# Patient Record
Sex: MALE | Race: WHITE | Employment: UNEMPLOYED | URBAN - METROPOLITAN AREA
[De-identification: names, ages, dates, MRNs, and addresses within clinical notes are randomized per-mention and may not be internally consistent; named-entity substitution may affect disease eponyms.]

---

## 2023-04-25 ENCOUNTER — TELEMEDICINE (OUTPATIENT)
Dept: PRIMARY CARE | Facility: CLINIC | Age: 33
End: 2023-04-25
Payer: MEDICARE

## 2023-04-25 DIAGNOSIS — J20.9 ACUTE BRONCHITIS, UNSPECIFIED ORGANISM: Primary | ICD-10-CM

## 2023-04-25 PROCEDURE — 99213 OFFICE O/P EST LOW 20 MIN: CPT | Performed by: FAMILY MEDICINE

## 2023-04-25 RX ORDER — AZITHROMYCIN 250 MG/1
TABLET, FILM COATED ORAL
Qty: 6 TABLET | Refills: 0 | Status: SHIPPED | OUTPATIENT
Start: 2023-04-25 | End: 2023-04-30

## 2023-04-25 ASSESSMENT — ENCOUNTER SYMPTOMS
NAUSEA: 1
COUGH: 1
HEADACHES: 1
SORE THROAT: 1
FEVER: 1
WHEEZING: 1
ABDOMINAL PAIN: 0
DIARRHEA: 0
VOMITING: 1

## 2023-04-25 NOTE — PROGRESS NOTES
Subjective   Patient ID: [id number]   Virtual or Telephone Consent    An interactive audio and video telecommunication system which permits real time communications between the patient (at the originating site) and provider (at the distant site) was utilized to provide this telehealth service.   Verbal consent was requested and obtained from Meera Akbar on this date, 04/25/23 for a telehealth visit.     Meera Akbar is a 32 y.o. male who presents for No chief complaint on file..  Fever   This is a new problem. The current episode started yesterday. The problem occurs constantly. The problem has been waxing and waning. The maximum temperature noted was 99 to 99.9 F. The temperature was taken using an oral thermometer. Associated symptoms include congestion, coughing, headaches, muscle aches, nausea, a sore throat, vomiting and wheezing. Pertinent negatives include no abdominal pain, chest pain, diarrhea, ear pain, rash or sleepiness.     Onset yesterday.   He has had vomiting and fever.  Has a cough and wheezing.  No SOB.  No abdominal pain.  No diarrhea or constipation.  Has a sore throat.  He plans to do a covid test.    Objective     There were no vitals taken for this visit.     Physical Exam  Constitutional:       Appearance: Normal appearance.   Pulmonary:      Effort: Pulmonary effort is normal. No respiratory distress.   Neurological:      General: No focal deficit present.      Mental Status: He is alert and oriented to person, place, and time.         Assessment/Plan   Problem List Items Addressed This Visit    None  Visit Diagnoses       Acute bronchitis, unspecified organism    -  Primary    Relevant Medications    azithromycin (Zithromax) 250 mg tablet        I recommend getting a covid test.  I called in antibiotics in case you test negative.      Mitchell Camejo, DO

## 2024-02-07 ENCOUNTER — TELEMEDICINE (OUTPATIENT)
Dept: PRIMARY CARE | Facility: CLINIC | Age: 34
End: 2024-02-07
Payer: MEDICARE

## 2024-02-07 DIAGNOSIS — R11.2 NAUSEA AND VOMITING, UNSPECIFIED VOMITING TYPE: ICD-10-CM

## 2024-02-07 DIAGNOSIS — B34.9 VIRAL SYNDROME: Primary | ICD-10-CM

## 2024-02-07 PROCEDURE — 1036F TOBACCO NON-USER: CPT | Performed by: FAMILY MEDICINE

## 2024-02-07 PROCEDURE — 99213 OFFICE O/P EST LOW 20 MIN: CPT | Performed by: FAMILY MEDICINE

## 2024-02-07 ASSESSMENT — ENCOUNTER SYMPTOMS
ABDOMINAL PAIN: 1
NAUSEA: 1
WHEEZING: 1
COUGH: 1
VOMITING: 1
HEADACHES: 1
FEVER: 1
SORE THROAT: 1

## 2024-02-07 NOTE — PROGRESS NOTES
Subjective   Patient ID: Meera Akbar is a 33 y.o. male who presents for No chief complaint on file..    Virtual or Telephone Consent    An interactive audio and video telecommunication system which permits real time communications between the patient (at the originating site) and provider (at the distant site) was utilized to provide this telehealth service.   Verbal consent was requested and obtained from Meera Akbar on this date, 02/07/24 for a telehealth visit.       Pt presents with cough, congestion, nausea, emesis  Minimally productive cough  Body aches  No SOB  No RX meds  No OTC meds for symptoms    No Asthma  Home COVID test, has not yet taken    Fever   This is a new problem. The current episode started yesterday. The problem occurs constantly. The problem has been waxing and waning. His temperature was unmeasured prior to arrival. Associated symptoms include abdominal pain, congestion, coughing, headaches, muscle aches, nausea, a sore throat, vomiting and wheezing. Pertinent negatives include no chest pain.        Review of Systems   Constitutional:  Positive for fever.   HENT:  Positive for congestion and sore throat.    Respiratory:  Positive for cough and wheezing.    Cardiovascular:  Negative for chest pain.   Gastrointestinal:  Positive for abdominal pain, nausea and vomiting.   Neurological:  Positive for headaches.       Objective   There were no vitals taken for this visit.    Physical Exam  Constitutional:       Appearance: Normal appearance.   HENT:      Nose: Congestion present.   Pulmonary:      Effort: Pulmonary effort is normal.   Neurological:      Mental Status: He is alert.       Virtual Visit Duration: 10 min    Assessment/Plan   Diagnoses and all orders for this visit:  Viral syndrome  Nausea and vomiting, unspecified vomiting type    Suspect viral syndrome, symptoms present x 2 days, reviewed typical course of viral illness   [facility] COVID and Flu testing  [facility] supportive care at  this time. Electrolyte replacing fluids, rest, tylenol, may add OTC Dayquil  Advised pt to call with any new or worsening of his symptoms    Yuni Arellano,

## 2024-09-10 ENCOUNTER — APPOINTMENT (OUTPATIENT)
Dept: PRIMARY CARE | Facility: CLINIC | Age: 34
End: 2024-09-10
Payer: MEDICARE

## 2024-09-13 ENCOUNTER — APPOINTMENT (OUTPATIENT)
Dept: PRIMARY CARE | Facility: CLINIC | Age: 34
End: 2024-09-13
Payer: MEDICARE

## 2024-09-17 ENCOUNTER — APPOINTMENT (OUTPATIENT)
Dept: PRIMARY CARE | Facility: CLINIC | Age: 34
End: 2024-09-17
Payer: MEDICARE

## 2024-09-17 VITALS
DIASTOLIC BLOOD PRESSURE: 70 MMHG | SYSTOLIC BLOOD PRESSURE: 102 MMHG | TEMPERATURE: 97.9 F | HEIGHT: 60 IN | WEIGHT: 111 LBS | BODY MASS INDEX: 21.79 KG/M2

## 2024-09-17 DIAGNOSIS — Z00.00 ROUTINE GENERAL MEDICAL EXAMINATION AT HEALTH CARE FACILITY: Primary | ICD-10-CM

## 2024-09-17 PROCEDURE — 1036F TOBACCO NON-USER: CPT | Performed by: FAMILY MEDICINE

## 2024-09-17 PROCEDURE — 3008F BODY MASS INDEX DOCD: CPT | Performed by: FAMILY MEDICINE

## 2024-09-17 PROCEDURE — 99395 PREV VISIT EST AGE 18-39: CPT | Performed by: FAMILY MEDICINE

## 2024-09-17 PROCEDURE — G0439 PPPS, SUBSEQ VISIT: HCPCS | Performed by: FAMILY MEDICINE

## 2024-09-17 ASSESSMENT — ENCOUNTER SYMPTOMS
SLEEP DISTURBANCE: 0
TROUBLE SWALLOWING: 0
WOUND: 0
COUGH: 0
ARTHRALGIAS: 0
CHILLS: 0
BACK PAIN: 0
PALPITATIONS: 0
OCCASIONAL FEELINGS OF UNSTEADINESS: 0
RHINORRHEA: 0
VOICE CHANGE: 0
WEAKNESS: 0
NERVOUS/ANXIOUS: 0
FEVER: 0
NUMBNESS: 0
SHORTNESS OF BREATH: 0
SINUS PAIN: 0
DYSPHORIC MOOD: 0
NAUSEA: 0
HEMATURIA: 0
DEPRESSION: 0
DIARRHEA: 0
LOSS OF SENSATION IN FEET: 0
ADENOPATHY: 0
MYALGIAS: 0
DYSURIA: 0
ABDOMINAL PAIN: 0
HEADACHES: 0
BLOOD IN STOOL: 0
SORE THROAT: 0
ROS SKIN COMMENTS: NO MOLES GROWING OR CHANGING.
CONSTIPATION: 0
VOMITING: 0
DIZZINESS: 0
WHEEZING: 0
FREQUENCY: 0
FATIGUE: 0

## 2024-09-17 ASSESSMENT — PATIENT HEALTH QUESTIONNAIRE - PHQ9
1. LITTLE INTEREST OR PLEASURE IN DOING THINGS: NOT AT ALL
2. FEELING DOWN, DEPRESSED OR HOPELESS: NOT AT ALL
SUM OF ALL RESPONSES TO PHQ9 QUESTIONS 1 AND 2: 0

## 2024-09-17 ASSESSMENT — ACTIVITIES OF DAILY LIVING (ADL)
GROCERY_SHOPPING: INDEPENDENT
DOING_HOUSEWORK: INDEPENDENT
DRESSING: INDEPENDENT
TAKING_MEDICATION: INDEPENDENT
BATHING: INDEPENDENT
MANAGING_FINANCES: INDEPENDENT

## 2024-09-17 NOTE — PROGRESS NOTES
Subjective   Reason for Visit: Meera Akbar is an 33 y.o. male here for a Medicare Wellness visit.          Reviewed all medications by prescribing practitioner or clinical pharmacist (such as prescriptions, OTCs, herbal therapies and supplements) and documented in the medical record.    No current outpatient medications on file prior to visit.     No current facility-administered medications on file prior to visit.     He has started working out.  He was overweight and has lost some weight.  He has been walking on the track.      No surgeries or hospitalizations over the last year.        HPI  Tobacco Use: Low Risk  (9/17/2024)    Patient History     Smoking Tobacco Use: Never     Smokeless Tobacco Use: Never     Passive Exposure: Not on file   No drugs.  No alcohol (very rare).      Patient Care Team:  Mitchell Camejo DO as PCP - General  Mitchell Camejo DO as PCP - [facility] [employer] PCP     Review of Systems   Constitutional:  Negative for chills, fatigue and fever.   HENT:  Negative for congestion, rhinorrhea, sinus pain, sore throat, trouble swallowing and voice change.    Respiratory:  Negative for cough, shortness of breath and wheezing.    Cardiovascular:  Negative for chest pain, palpitations and leg swelling.   Gastrointestinal:  Negative for abdominal pain, blood in stool, constipation, diarrhea, nausea and vomiting.   Genitourinary:  Negative for dysuria, frequency, hematuria, scrotal swelling and testicular pain.   Musculoskeletal:  Negative for arthralgias, back pain and myalgias.   Skin:  Negative for rash and wound.        No moles growing or changing.   Neurological:  Negative for dizziness, syncope, weakness, numbness and headaches.   Hematological:  Negative for adenopathy.   Psychiatric/Behavioral:  Negative for dysphoric mood, self-injury, sleep disturbance and suicidal ideas. The patient is not nervous/anxious.        Objective   Vitals:  /70 (BP Location: Left arm,  "Patient Position: Sitting)   Temp 36.6 °C (97.9 °F) (Skin)   Ht 1.168 m (3' 10\")   Wt 50.3 kg (111 lb)   BMI 36.88 kg/m²       Physical Exam  Vitals reviewed.   Constitutional:       General: He is not in acute distress.     Appearance: Normal appearance. He is not ill-appearing or toxic-appearing.   HENT:      Head: Normocephalic and atraumatic.      Right Ear: Tympanic membrane, ear canal and external ear normal.      Left Ear: Tympanic membrane, ear canal and external ear normal.      Nose: Nose normal.      Mouth/Throat:      Mouth: Mucous membranes are moist.   Eyes:      Extraocular Movements: Extraocular movements intact.      Conjunctiva/sclera: Conjunctivae normal.      Pupils: Pupils are equal, round, and reactive to light.   Cardiovascular:      Rate and Rhythm: Normal rate and regular rhythm.      Heart sounds: No murmur heard.  Pulmonary:      Effort: Pulmonary effort is normal.      Breath sounds: Normal breath sounds.   Abdominal:      General: Bowel sounds are normal. There is no distension.      Palpations: Abdomen is soft. There is no mass.      Tenderness: There is no abdominal tenderness. There is no guarding or rebound.      Hernia: No hernia is present.   Genitourinary:     Penis: Normal.       Testes: Normal.   Musculoskeletal:         General: No tenderness.      Cervical back: Neck supple.      Right lower leg: No edema.      Left lower leg: No edema.      Comments: achondroplasia   Skin:     Coloration: Skin is not jaundiced or pale.      Findings: No rash.   Neurological:      General: No focal deficit present.      Mental Status: He is alert and oriented to person, place, and time. Mental status is at baseline.   Psychiatric:         Mood and Affect: Mood normal.         Behavior: Behavior normal.         Thought Content: Thought content normal.         Judgment: Judgment normal.         Assessment & Plan  Routine general medical examination at health care facility    Orders:    1 " Year Follow Up In Primary Care - Wellness Exam; Future       Annual Wellness exam completed   Preventive Health history reviewed:  Labs recommended.  He declines, stating he is fearful of needles.  Low dose CT for lung cancer screening not indicated.  Prostate cancer screening not indicated.  Cscope or Cologuard not indicated.  Depression Screening done  Advanced Directives Discussion Completed  Cardiovascular risk discussed and if needed, lifestyle modifications recommended, including nutritional choices, exercise, and elimination of habits contributing to risk.  We agreed on a plan to reduce the current cardiovascular risk.  See ecalc ASCVD Risk  Plus for data discussed regarding risk and risk reduction opportunities.  Aspirin use is not indicated after reviewing the updated guidelines.   Immunizations:  Influenza declines.  Prevnar 20 not inidicated  Shingrix not indicated.    Your exam was normal today. It is good that you are exercising.  Try to avoid sugars and carbs in the diet to try to lose weight.  Return in one year for a general medical exam.  Labs and a flu shot are recommended.  Let me know if you change your mind about this.

## 2024-09-17 NOTE — PATIENT INSTRUCTIONS
Your exam was normal today. It is good that you are exercising.  Try to avoid sugars and carbs in the diet to try to lose weight.  Return in one year for a general medical exam.  Labs and a flu shot are recommended.  Let me know if you change your mind about this.